# Patient Record
Sex: MALE | Race: BLACK OR AFRICAN AMERICAN | URBAN - METROPOLITAN AREA
[De-identification: names, ages, dates, MRNs, and addresses within clinical notes are randomized per-mention and may not be internally consistent; named-entity substitution may affect disease eponyms.]

---

## 2024-04-25 ENCOUNTER — HOSPITAL ENCOUNTER (EMERGENCY)
Age: 24
Discharge: HOME | End: 2024-04-25

## 2024-04-25 ENCOUNTER — APPOINTMENT (OUTPATIENT)
Dept: GENERAL RADIOLOGY | Age: 24
End: 2024-04-25

## 2024-04-25 VITALS
OXYGEN SATURATION: 99 % | SYSTOLIC BLOOD PRESSURE: 121 MMHG | DIASTOLIC BLOOD PRESSURE: 90 MMHG | HEIGHT: 72 IN | RESPIRATION RATE: 18 BRPM | TEMPERATURE: 99.2 F | WEIGHT: 170 LBS | HEART RATE: 75 BPM | BODY MASS INDEX: 23.03 KG/M2

## 2024-04-25 DIAGNOSIS — M25.561 ACUTE PAIN OF RIGHT KNEE: Primary | ICD-10-CM

## 2024-04-25 PROCEDURE — 99283 EMERGENCY DEPT VISIT LOW MDM: CPT

## 2024-04-25 PROCEDURE — 73560 X-RAY EXAM OF KNEE 1 OR 2: CPT

## 2024-04-25 RX ORDER — DICLOFENAC SODIUM 75 MG/1
75 TABLET, DELAYED RELEASE ORAL 2 TIMES DAILY PRN
Qty: 20 TABLET | Refills: 0 | Status: SHIPPED | OUTPATIENT
Start: 2024-04-25

## 2024-04-25 ASSESSMENT — PAIN SCALES - GENERAL: PAINLEVEL_OUTOF10: 10

## 2024-04-25 ASSESSMENT — LIFESTYLE VARIABLES
HOW MANY STANDARD DRINKS CONTAINING ALCOHOL DO YOU HAVE ON A TYPICAL DAY: PATIENT DOES NOT DRINK
HOW OFTEN DO YOU HAVE A DRINK CONTAINING ALCOHOL: NEVER

## 2024-04-25 ASSESSMENT — PAIN DESCRIPTION - ORIENTATION: ORIENTATION: RIGHT

## 2024-04-25 ASSESSMENT — PAIN - FUNCTIONAL ASSESSMENT: PAIN_FUNCTIONAL_ASSESSMENT: 0-10

## 2024-04-25 ASSESSMENT — PAIN DESCRIPTION - LOCATION: LOCATION: LEG

## 2024-04-26 ENCOUNTER — TELEPHONE (OUTPATIENT)
Dept: ORTHOPEDIC SURGERY | Age: 24
End: 2024-04-26

## 2024-04-26 NOTE — ED PROVIDER NOTES
[facility] EMERGENCY DEPARTMENT  EMERGENCY DEPARTMENT ENCOUNTER        Pt Name: Fred Pickering  MRN: [id number]  Birthdate 2000  Date of evaluation: 4/25/2024  Provider: Vinayak Luke PA-C  PCP: No primary care provider on file.  Note Started: 10:01 PM EDT 4/25/24      FELIPE. I have evaluated this patient.        CHIEF COMPLAINT       Chief Complaint   Patient presents with    Leg Injury     Pt arrives to ED with girlfriend c/o right lower leg pain, states he was running while playing basketball, states he heard \"2 snaps\" and that he \"landed on it weird\", states he is unable to bear on that right leg.       HISTORY OF PRESENT ILLNESS: 1 or more Elements     History From: pt    Fred Pickering is a 24 y.o. male who presents complaining of right knee pain.  Patient states he was playing basketball tonight, running, felt a \"pop \"and has right knee pain, decreased range of motion.  Denies any prior injury, weakness, paresthesia, swelling.  Pain is worse with bearing weight, relieved by rest.  Denies any fever, redness.    Nursing Notes were all reviewed and agreed with or any disagreements were addressed in the HPI.    REVIEW OF SYSTEMS :      Review of Systems   All other systems reviewed and are negative.      Positives and Pertinent negatives as per HPI.       PAST MEDICAL HISTORY    has no past medical history on file.     SURGICAL HISTORY   No past surgical history on file.    CURRENTMEDICATIONS       Previous Medications    No medications on file       ALLERGIES     Patient has no known allergies.    FAMILYHISTORY     No family history on file.     SOCIAL HISTORY          SCREENINGS        [place] Coma Scale  Eye Opening: Spontaneous  Best Verbal Response: Oriented  Best Motor Response: Obeys commands  [place] Coma Scale Score: 15                CIWA Assessment  BP: (!) 121/90  Pulse: 75           PHYSICAL EXAM  1 or more Elements     ED Triage Vitals [04/25/24 2155]   BP Temp Temp

## 2024-04-26 NOTE — ED NOTES
This RN educated pt on use of crutches, pt was able to demonstrate proper use prior to discharge. Pt educated on RICE treatment of joints, encouraged pt to ice and elevate his knee while resting, and to wearing ace wrap or compression brace while awake.